# Patient Record
Sex: FEMALE | Race: WHITE | NOT HISPANIC OR LATINO | Employment: UNEMPLOYED | URBAN - METROPOLITAN AREA
[De-identification: names, ages, dates, MRNs, and addresses within clinical notes are randomized per-mention and may not be internally consistent; named-entity substitution may affect disease eponyms.]

---

## 2024-03-19 ENCOUNTER — HOSPITAL ENCOUNTER (EMERGENCY)
Facility: HOSPITAL | Age: 44
Discharge: HOME | End: 2024-03-19
Attending: EMERGENCY MEDICINE
Payer: COMMERCIAL

## 2024-03-19 VITALS
OXYGEN SATURATION: 96 % | TEMPERATURE: 98.2 F | SYSTOLIC BLOOD PRESSURE: 151 MMHG | DIASTOLIC BLOOD PRESSURE: 104 MMHG | RESPIRATION RATE: 18 BRPM | WEIGHT: 206.8 LBS | HEART RATE: 100 BPM

## 2024-03-19 DIAGNOSIS — M54.50 ACUTE LOW BACK PAIN: Primary | ICD-10-CM

## 2024-03-19 PROCEDURE — 99284 EMERGENCY DEPT VISIT MOD MDM: CPT

## 2024-03-19 PROCEDURE — 99282 EMERGENCY DEPT VISIT SF MDM: CPT

## 2024-03-19 PROCEDURE — 96372 THER/PROPH/DIAG INJ SC/IM: CPT

## 2024-03-19 RX ORDER — KETOROLAC TROMETHAMINE 30 MG/ML
15 INJECTION, SOLUTION INTRAMUSCULAR; INTRAVENOUS ONCE
Status: COMPLETED | OUTPATIENT
Start: 2024-03-19 | End: 2024-03-19

## 2024-03-19 RX ORDER — METHOCARBAMOL 500 MG/1
500 TABLET, FILM COATED ORAL 2 TIMES DAILY
Qty: 20 TABLET | Refills: 0 | Status: SHIPPED | OUTPATIENT
Start: 2024-03-19

## 2024-03-19 RX ORDER — LIDOCAINE 50 MG/G
1 PATCH TOPICAL EVERY 24 HOURS
Qty: 15 PATCH | Refills: 0 | Status: SHIPPED | OUTPATIENT
Start: 2024-03-19 | End: 2024-04-03

## 2024-03-19 RX ORDER — METHOCARBAMOL 500 MG/1
500 TABLET, FILM COATED ORAL ONCE
Status: COMPLETED | OUTPATIENT
Start: 2024-03-19 | End: 2024-03-19

## 2024-03-19 RX ORDER — LIDOCAINE 50 MG/G
1 PATCH TOPICAL ONCE
Status: DISCONTINUED | OUTPATIENT
Start: 2024-03-19 | End: 2024-03-19 | Stop reason: HOSPADM

## 2024-03-19 RX ORDER — NAPROXEN 500 MG/1
500 TABLET ORAL 2 TIMES DAILY WITH MEALS
Qty: 10 TABLET | Refills: 0 | Status: SHIPPED | OUTPATIENT
Start: 2024-03-19 | End: 2025-03-19

## 2024-03-19 RX ADMIN — LIDOCAINE 1 PATCH: 50 PATCH CUTANEOUS at 13:59

## 2024-03-19 RX ADMIN — METHOCARBAMOL TABLETS 500 MG: 500 TABLET, COATED ORAL at 13:59

## 2024-03-19 RX ADMIN — KETOROLAC TROMETHAMINE 15 MG: 30 INJECTION, SOLUTION INTRAMUSCULAR; INTRAVENOUS at 13:59

## 2024-03-19 NOTE — DISCHARGE INSTRUCTIONS
Do not drive or operate heavy machinery while taking robaxin. Follow up with PCP,  [facility]. Do not take other NSAIDs while taking naproxen (including but not limited to ibuprofen, motrin, advil, aleeve, naprosyn). Return to ED for new or worsening symptoms as discussed.

## 2024-03-19 NOTE — ED PROVIDER NOTES
History  Chief Complaint   Patient presents with    Back Pain     Pt reports back spasms that started about a week ago. Took tylenol and ibuprofen last night with no relief. Reports this is an old injury that is flaring up again.     44-year-old female with past medical history of chronic left-sided weakness, TBI, psychiatric disorders, and chronic back pain presents emergency department complaining of acute exacerbation of L low back pain.  Denies any new neurologic symptoms including new weakness or sensory deficit or worsening from baseline.  Denies trauma.      History provided by:  Patient  Back Pain  Location:  Lumbar spine  Radiates to:  L foot  Duration:  1 week  Context: not falling, not jumping from heights, not lifting heavy objects, not MVA, not recent injury and not twisting    Ineffective treatments:  None tried  Associated symptoms: no abdominal pain, no abdominal swelling, no bladder incontinence, no bowel incontinence, no chest pain, no dysuria, no fever, no headaches, no numbness, no paresthesias, no pelvic pain, no perianal numbness, no tingling and no weakness (per baseline)    Risk factors: no hx of cancer, no hx of osteoporosis and no steroid use    Risk factors comment:  Denies IVDA or injections into spine      None       Past Medical History:   Diagnosis Date    Bipolar 2 disorder ([facility])     Depression     Left-sided weakness     Panic attack     PTSD (post-traumatic stress disorder)     Social anxiety disorder     TBI (traumatic brain injury) ([facility])        Past Surgical History:   Procedure Laterality Date     SECTION      CORRECTION HAMMER TOE      TENDON TRANSFER      TRACHEOSTOMY         History reviewed. No pertinent family history.  I have reviewed and agree with the history as documented.    E-Cigarette/Vaping    E-Cigarette Use Current Every Day User      E-Cigarette/Vaping Substances    Nicotine Yes     Flavoring Yes      Social History     Tobacco Use    Smoking status:  Every Day     Current packs/day: 0.50     Types: Cigarettes    Smokeless tobacco: Never   Vaping Use    Vaping status: Every Day    Substances: Nicotine, Flavoring   Substance Use Topics    Alcohol use: Never    Drug use: Never       Review of Systems   Constitutional:  Negative for chills, diaphoresis and fever.   Respiratory:  Negative for shortness of breath.    Cardiovascular:  Negative for chest pain and leg swelling.   Gastrointestinal:  Negative for abdominal pain, bowel incontinence and vomiting.   Genitourinary:  Negative for bladder incontinence, decreased urine volume, difficulty urinating, dysuria and pelvic pain.   Musculoskeletal:  Positive for back pain. Negative for joint swelling and neck pain.   Skin:  Negative for color change, rash and wound.   Neurological:  Negative for tingling, weakness (per baseline), numbness, headaches and paresthesias.   All other systems reviewed and are negative.      Physical Exam  Physical Exam  Vitals and nursing note reviewed.   Constitutional:       General: She is awake. She is not in acute distress.     Appearance: Normal appearance. She is not ill-appearing.   HENT:      Head: Normocephalic and atraumatic.      Mouth/Throat:      Lips: Pink.      Mouth: Mucous membranes are moist.   Eyes:      General: Vision grossly intact.   Cardiovascular:      Rate and Rhythm: Normal rate and regular rhythm.      Pulses:           Dorsalis pedis pulses are 2+ on the right side and 2+ on the left side.        Posterior tibial pulses are 2+ on the right side and 2+ on the left side.      Heart sounds: Normal heart sounds.   Pulmonary:      Effort: Pulmonary effort is normal. No respiratory distress.      Breath sounds: Normal breath sounds.   Abdominal:      General: There is no distension.      Palpations: Abdomen is soft.      Tenderness: There is no abdominal tenderness.   Musculoskeletal:      Cervical back: Neck supple.      Lumbar back: Tenderness present. No bony  tenderness.      Right lower leg: No edema.      Left lower leg: No edema.      Comments: No midline spinal tenderness, deformities, crepitus, step-off, skin changes noted to the area of pain.   Skin:     General: Skin is warm and dry.      Capillary Refill: Capillary refill takes less than 2 seconds.      Findings: No bruising, erythema or rash.   Neurological:      Mental Status: She is alert and oriented to person, place, and time.      Sensory: No sensory deficit.      Motor: Weakness: at baseline.      Gait: Gait normal.      Comments: At baseline    Psychiatric:         Mood and Affect: Mood normal.         Behavior: Behavior normal.         Vital Signs  ED Triage Vitals [03/19/24 1311]   Temperature Pulse Respirations Blood Pressure SpO2   98.2 °F (36.8 °C) 100 18 (!) 151/104 96 %      Temp Source Heart Rate Source Patient Position - Orthostatic VS BP Location FiO2 (%)   Oral Monitor Sitting Left arm --      Pain Score       --           Vitals:    03/19/24 1311   BP: (!) 151/104   Pulse: 100   Patient Position - Orthostatic VS: Sitting         Visual Acuity      ED Medications  Medications   ketorolac (TORADOL) injection 15 mg (15 mg Intramuscular Given 3/19/24 1359)   methocarbamol (ROBAXIN) tablet 500 mg (500 mg Oral Given 3/19/24 1359)       Diagnostic Studies  Results Reviewed       None                   No orders to display              Procedures  Procedures         ED Course  ED Course as of 03/20/24 1344   Tue Mar 19, 2024   1335 Denies chance of pregnancy. LMP 2016.                                SBIRT 20yo+      Flowsheet Row Most Recent Value   Initial Alcohol Screen: US AUDIT-C     1. How often do you have a drink containing alcohol? 0 Filed at: 03/19/2024 1311   2. How many drinks containing alcohol do you have on a typical day you are drinking?  0 Filed at: 03/19/2024 1311   3a. Male UNDER 65: How often do you have five or more drinks on one occasion? 0 Filed at: 03/19/2024 1311   3b. FEMALE  Any Age, or MALE 65+: How often do you have 4 or more drinks on one occassion? 0 Filed at: 03/19/2024 1311   Audit-C Score 0 Filed at: 03/19/2024 1311   SALAZAR: How many times in the past year have you...    Used an illegal drug or used a prescription medication for non-medical reasons? Never Filed at: 03/19/2024 1311                      Medical Decision Making  Patient is experiencing acute lower back pain x 1 week. The patient has no symptoms of new extremity weakness or paresthesia, urinary retention or incontinence, fecal incontinence, saddle anesthesia or paresthesia, unintentional weight loss, unexplained fevers/chills or night sweats, recent trauma, persistent vertigo present at rest, or truncal ataxia. No abdominal pain, or chest pain. The patient has no history of Cancer (active or in remission), osteoporosis, chronic corticosteroid use, immunosuppression, recent spinal procedures or IVDA. There were no unexplained abnormal vital signs or new neurologic deficits on physical exam. History does not suggest diagnosis of cauda equina syndrome.  Patient denies history of IVDA, denies history of fevers, no recent surgeries or any procedures to suggest a transient bacteremia leading to a diagnosis of subdural abscess. Denies history of blood thinner use with recent history of lumbar puncture or any violation of the epidural space to suggest history of epidural hematoma. Therefore these above diagnoses (cauda equina syndrome, epidural abscess, epidural hematoma) were not pursued with diagnostic imaging, additional imaging or workup not indicated at this time. Plan for supportive care, comprehensive spine follow up.     All imaging and/or lab testing discussed with patient, strict return to ED precautions discussed. Patient recommended to follow up promptly with appropriate outpatient provider. Patient and/or family members verbalizes understanding and agrees with plan. Patient and/or family members were given  "opportunity to ask questions, all questions were answered at this time. Patient is stable for discharge.     Portions of the record may have been created with voice recognition software. Occasional wrong word or \"sound a like\" substitutions may have occurred due to the inherent limitations of voice recognition software. Read the chart carefully and recognize, using context, where substitutions have occurred.            Risk  Prescription drug management.             Disposition  Final diagnoses:   Acute low back pain     Time reflects when diagnosis was documented in both MDM as applicable and the Disposition within this note       Time User Action Codes Description Comment    3/19/2024  1:37 PM Marietta Mcmahon [M54.50] Acute low back pain           ED Disposition       ED Disposition   Discharge    Condition   Stable    Date/Time   Tue Mar 19, 2024 1337    Comment   More Simeon discharge to home/self care.                   Follow-up Information       Follow up With Specialties Details Why Contact Info    Chelita Armando MD Family Medicine   [address] [place] [address]  [phone]              Discharge Medication List as of 3/19/2024  2:02 PM        START taking these medications    Details   Diclofenac Sodium (VOLTAREN) 1 % Apply 2 g topically 4 (four) times a day, Starting Tue 3/19/2024, Normal      lidocaine (LIDODERM) 5 % Apply 1 patch topically over 12 hours every 24 hours for 15 days Remove & Discard patch within 12 hours or as directed by MD, Starting Tue 3/19/2024, Until Wed 4/3/2024, Normal      methocarbamol (ROBAXIN) 500 mg tablet Take 1 tablet (500 mg total) by mouth 2 (two) times a day, Starting Tue 3/19/2024, Normal      naproxen (EC NAPROSYN) 500 MG EC tablet Take 1 tablet (500 mg total) by mouth 2 (two) times a day with meals, Starting Tue 3/19/2024, Until Wed 3/19/2025, Normal                 PDMP Review       None            ED Provider  Electronically Signed by           "   Marietta Mcmahon PA-C  03/20/24 1342

## 2024-03-20 ENCOUNTER — TELEPHONE (OUTPATIENT)
Dept: PHYSICAL THERAPY | Facility: OTHER | Age: 44
End: 2024-03-20

## 2024-03-20 ENCOUNTER — NURSE TRIAGE (OUTPATIENT)
Dept: PHYSICAL THERAPY | Facility: OTHER | Age: 44
End: 2024-03-20

## 2024-03-20 DIAGNOSIS — M54.41 CHRONIC BILATERAL LOW BACK PAIN WITH BILATERAL SCIATICA: Primary | ICD-10-CM

## 2024-03-20 DIAGNOSIS — G89.29 CHRONIC BILATERAL LOW BACK PAIN WITH BILATERAL SCIATICA: Primary | ICD-10-CM

## 2024-03-20 DIAGNOSIS — M54.42 CHRONIC BILATERAL LOW BACK PAIN WITH BILATERAL SCIATICA: Primary | ICD-10-CM
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
## 2024-03-20 NOTE — TELEPHONE ENCOUNTER
"Additional Information   Negative: Has the patient had unexplained weight loss?   Affirmative: Is this a chronic condition?   Negative: Does the patient have a fever?   Negative: Is the patient experiencing blood in sputum?   Negative: Is the patient experiencing urine retention?   Negative: Is the patient experiencing acute drop foot or paralysis?   Negative: Has the patient experienced major trauma? (fall from height, high speed collision, direct blow to spine) and is also experiencing nausea, light-headedness, or loss of consciousness?    Protocols used: [facility] Protocol    Patient states has been \"constant everyday since 1989.\"    [facility] was reviewed in detail and what we can provide for their back pain.  Patient is agreeable to being triaged and would like to proceed with Physical Therapy.    Referral was placed for Physical Therapy at the [place] site. Patients information was sent to the  to make evaluation appointment. Patient made aware that the PT office  will be calling to schedule the appointment.  Patient was provided with the phone number to the PT office.    No further questions and/or concerns were voiced by the patient at this time. Patient states understanding of the referral that was placed.    Referral Closed.    "

## 2024-04-03 ENCOUNTER — EVALUATION (OUTPATIENT)
Dept: PHYSICAL THERAPY | Facility: CLINIC | Age: 44
End: 2024-04-03
Payer: COMMERCIAL

## 2024-04-03 DIAGNOSIS — M54.42 CHRONIC BILATERAL LOW BACK PAIN WITH BILATERAL SCIATICA: ICD-10-CM

## 2024-04-03 DIAGNOSIS — M54.41 CHRONIC BILATERAL LOW BACK PAIN WITH BILATERAL SCIATICA: ICD-10-CM

## 2024-04-03 DIAGNOSIS — G89.29 CHRONIC BILATERAL LOW BACK PAIN WITH BILATERAL SCIATICA: ICD-10-CM

## 2024-04-03 PROCEDURE — 97161 PT EVAL LOW COMPLEX 20 MIN: CPT | Performed by: PHYSICAL THERAPIST

## 2024-04-03 PROCEDURE — 97140 MANUAL THERAPY 1/> REGIONS: CPT | Performed by: PHYSICAL THERAPIST

## 2024-04-03 PROCEDURE — 97112 NEUROMUSCULAR REEDUCATION: CPT | Performed by: PHYSICAL THERAPIST
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
## 2024-04-09 ENCOUNTER — APPOINTMENT (OUTPATIENT)
Dept: PHYSICAL THERAPY | Facility: CLINIC | Age: 44
End: 2024-04-09
Payer: COMMERCIAL

## 2024-04-16 ENCOUNTER — TELEPHONE (OUTPATIENT)
Dept: OBGYN CLINIC | Facility: CLINIC | Age: 44
End: 2024-04-16

## 2024-04-30 ENCOUNTER — APPOINTMENT (OUTPATIENT)
Dept: PHYSICAL THERAPY | Facility: CLINIC | Age: 44
End: 2024-04-30
Payer: COMMERCIAL

## 2024-05-23 ENCOUNTER — TELEPHONE (OUTPATIENT)
Dept: OBGYN CLINIC | Facility: CLINIC | Age: 44
End: 2024-05-23